# Patient Record
Sex: FEMALE | Race: OTHER | URBAN - METROPOLITAN AREA
[De-identification: names, ages, dates, MRNs, and addresses within clinical notes are randomized per-mention and may not be internally consistent; named-entity substitution may affect disease eponyms.]

---

## 2017-05-02 ENCOUNTER — HOSPITAL ENCOUNTER (OUTPATIENT)
Dept: LAB | Age: 67
Discharge: HOME | End: 2017-05-02
Payer: MEDICARE

## 2017-05-02 ENCOUNTER — OFFICE VISIT (OUTPATIENT)
Dept: GYNECOLOGY | Age: 67
End: 2017-05-02

## 2017-05-02 VITALS
DIASTOLIC BLOOD PRESSURE: 73 MMHG | WEIGHT: 193.8 LBS | SYSTOLIC BLOOD PRESSURE: 147 MMHG | HEIGHT: 62 IN | HEART RATE: 72 BPM | BODY MASS INDEX: 35.66 KG/M2

## 2017-05-02 DIAGNOSIS — Z85.42 HISTORY OF ENDOMETRIAL CANCER: Primary | ICD-10-CM

## 2017-05-02 PROCEDURE — 88142 CYTOPATH C/V THIN LAYER: CPT | Performed by: OBSTETRICS & GYNECOLOGY

## 2017-05-02 RX ORDER — HYDROCHLOROTHIAZIDE 12.5 MG/1
TABLET ORAL
Refills: 1 | COMMUNITY
Start: 2017-04-14

## 2017-05-02 RX ORDER — CARVEDILOL 6.25 MG/1
TABLET ORAL
Refills: 3 | COMMUNITY
Start: 2017-04-11 | End: 2018-05-08 | Stop reason: SDUPTHER

## 2017-05-02 RX ORDER — AMLODIPINE BESYLATE 10 MG/1
TABLET ORAL
Refills: 1 | COMMUNITY
Start: 2017-02-09

## 2017-05-02 RX ORDER — ALBUTEROL SULFATE 1.25 MG/3ML
SOLUTION RESPIRATORY (INHALATION)
Refills: 2 | COMMUNITY
Start: 2017-02-10

## 2017-05-02 NOTE — PATIENT INSTRUCTIONS
ICEX Activation    Thank you for requesting access to [facility]EX. Please follow the instructions below to securely access and download your online medical record. ICEX allows you to send messages to your doctor, view your test results, renew your prescriptions, schedule appointments, and more. How Do I Sign Up? 1. In your internet browser, go to [email or web address]. 2. Click on the First Time User? Click Here link in the Sign In box. You will see the New Member Sign Up page. 3. Enter your ICEX Access Code exactly as it appears below. You will not need to use this code after youve completed the sign-up process. If you do not sign up before the expiration date, you must request a new code. ICEX Access Code: [email or web address]  Expires: 2017 10:16 AM (This is the date your ICEX access code will )    4. Enter the last four digits of your Social Security Number (xxxx) and Date of Birth (mm/dd/yyyy) as indicated and click Submit. You will be taken to the next sign-up page. 5. Create a ICEX ID. This will be your ICEX login ID and cannot be changed, so think of one that is secure and easy to remember. 6. Create a ICEX password. You can change your password at any time. 7. Enter your Password Reset Question and Answer. This can be used at a later time if you forget your password. 8. Enter your e-mail address. You will receive e-mail notification when new information is available in [address] 9. Click Sign Up. You can now view and download portions of your medical record. 10. Click the Download Summary menu link to download a portable copy of your medical information. Additional Information    If you have questions, please visit the Frequently Asked Questions section of the ICEX website at [email or web address] Remember, ICEX is NOT to be used for urgent needs. For medical emergencies, dial 911.

## 2017-05-02 NOTE — PROGRESS NOTES
[address] [place] [address] Mathias Moritz 723 [address] [facility]  [phone]  MD Rosaura Rosenbaum MD    Patient ID:  Moon Campa  [id number]  [birth year]/28 y.o. Visit date: 5/2/2017    INTERVAL HISTORY: Moon Campa is a  female with a history of endometrial cancer previous procedures include staging resection, 5/7/2010. The patent has been followed without complication to date. Pathology:   Uterus, cervix, bilateral ovaries and fallopian tubes, hysterectomy:  Cervix: Microglandular hyperplasia. Endometrium: Invasive endometrioid adenocarcinoma, FIGO grade-2. Myometrium: Leiomyoma. Ovaries: Physiologic changes. Fallopian tubes: No pathologic abnormality. Synoptic Report:   Specimen: Uterus, cervix and adnexae. Lymph nodes: Not present. Specimen integrity: Intact hysterectomy. Procedure: Simple hysterectomy. Histologic type: Endometrioid adenocarcinoma. Histologic stgstrstastdstest:st st1st. Lymphatic invasion: Not identified. Pathologic stage: T1a NX MX (tumor invades . 3cm of an average  myometrial thickness of 1.6 cm). Last cytology: 3/16/2016, 8/15/2015, 2/12/2015   Normal    Imaging history: Mammogram current  Chemotherapy history: None    She is being seen today for routine followup at a six month interval  Ongoing workup for anemia of unclear etiology (GI)  Upper/lower endoscopy normal by patient report  No weight loss. Active, no restrictions. Negative GI review. Urinary incontinence noted. Negative cardiopulmonary review. Patient denies any abnormal bleeding or vaginal discharge. Weight stable.     ROS:  Review of Systems - History obtained from the patient  Hematological and Lymphatic ROS: negative for - bleeding problems, jaundice or night sweats  Respiratory ROS: no cough, shortness of breath, or wheezing  Cardiovascular ROS: no chest pain or dyspnea on exertion  Gastrointestinal ROS: no abdominal pain, change in bowel habits, or black or bloody stools  Genito-Urinary ROS: no dysuria, trouble voiding, or hematuria  Musculoskeletal ROS: negative for - gait disturbance or joint swelling  Neurological ROS: no TIA or stroke symptoms      Past Medical History:   Diagnosis Date    Arthritis     Asthma     Cancer ([facility] [address]     endometrial    COPD     Lung cancer ([facility]        Past Surgical History:   Procedure Laterality Date    HX GYN      hysterectomy    HX UROLOGICAL      uti x 5 seeing urologist       Social History     Social History    Marital status:      Spouse name: N/A    Number of children: N/A    Years of education: N/A     Occupational History    Not on file. Social History Main Topics    Smoking status: Former Smoker    Smokeless tobacco: Never Used    Alcohol use No    Drug use: No    Sexual activity: Not on file     Other Topics Concern    Not on file     Social History Narrative       History reviewed. No pertinent family history. Current Outpatient Prescriptions on File Prior to Visit   Medication Sig Dispense Refill    lisinopril (PRINIVIL, ZESTRIL) 40 mg tablet Take 40 mg by mouth daily.  tiotropium (SPIRIVA WITH HANDIHALER) 18 mcg inhalation capsule Take 1 Cap by inhalation daily.  montelukast (SINGULAIR) 10 mg tablet Take 10 mg by mouth daily.  fluticasone-salmeterol (ADVAIR DISKUS) 500-50 mcg/dose diskus inhaler Take 1 Puff by inhalation every twelve (12) hours.  IPRATROPIUM/ALBUTEROL SULFATE (COMBIVENT IN) Take  by inhalation. No current facility-administered medications on file prior to visit. Allergies   Allergen Reactions    Adhesive Rash    Biaxin [Clarithromycin] Rash       ROS:  Negative except HPI    OBJECTIVE:  Vitals:    Vitals:    05/02/17 1014   BP: 147/73   Pulse: 72   Weight: 193 lb 12.8 oz (87.9 kg)   Height: 5' 2\" (1.575 m)     Body mass index is 35.45 kg/(m^2). HEENT: within normal limits, no JVD.  No thyroid enlargement   RESPIRATORY: lungs clear to auscultation and percussion   CARDIOVASC: Regular rate and rhythm    GASTROINT: soft, non-tender, without masses or organomegaly. Incision intact, no hernia. MUSCULOSKEL: no joint tenderness, deformity or swelling   INTEGUMENT:    EXTREMITIES: extremities normal, atraumatic, no cyanosis or edema   PELVIC: External genitalia: normal general appearance, BUS negative  Vaginal: atrophic mucosa, cytology taken without friability. No suspicious masses, induration or tenderness. Adnexa: normal bimanual exam, non palpable and removed surgically. PSW clear   RECTAL: rectal exam not indicated   RASHAD SURVEY: Cervical, supraclavicular, and axillary nodes normal.   NEURO: Grossly normal     DATE REVIEW as available:  Lab Results   Component Value Date/Time    WBC 8.6 05/08/2010 05:50 AM    HGB 12.1 05/08/2010 05:50 AM    HCT 38.3 05/08/2010 05:50 AM    PLATELET 802 40/62/3237 05:50 AM    MCV 88.0 05/08/2010 05:50 AM     Lab Results   Component Value Date/Time    Sodium 139 05/08/2010 05:50 AM    Potassium 5.2 05/08/2010 05:50 AM    Chloride 104 05/08/2010 05:50 AM    CO2 30 05/08/2010 05:50 AM    Anion gap 5 05/08/2010 05:50 AM    Glucose 95 05/08/2010 05:50 AM    BUN 10 05/08/2010 05:50 AM    Creatinine 0.7 05/08/2010 05:50 AM    BUN/Creatinine ratio 14 05/08/2010 05:50 AM    GFR est AA >60 05/08/2010 05:50 AM    GFR est non-AA >60 05/08/2010 05:50 AM    Calcium 8.8 05/08/2010 05:50 AM       IMPRESSION AND PLAN:    Mary Ackerman has a working diagnosis of endometrial cancer, ELIZA.     Cytology taken without friability  Return 12 months or PRN, pending normal cytology      Yoan Viveros MD  5/2/2017/9:44 AM

## 2017-05-02 NOTE — PROGRESS NOTES
One year check up, pt was diagnosed with lung cancer since her last visit, pt reports no abnormal spotting or bleeding

## 2017-05-09 NOTE — PROGRESS NOTES
Patient:   Torey Palomino  SSN: [id number]  : 1950    Date:    2017    Ms. Petar Ervin's cytology/Pap smear has been interpreted as within normal limts. I would ask that subsequent Pap smears be performed at the interval discussed at the last office visit.     If there are any questions please do not hesitate to contact our offices ([phone])    Ayden De La Cruz MD

## 2018-05-08 ENCOUNTER — HOSPITAL ENCOUNTER (OUTPATIENT)
Dept: LAB | Age: 68
Discharge: HOME | End: 2018-05-08
Payer: MEDICARE

## 2018-05-08 ENCOUNTER — OFFICE VISIT (OUTPATIENT)
Dept: GYNECOLOGY | Age: 68
End: 2018-05-08

## 2018-05-08 VITALS
WEIGHT: 188.4 LBS | DIASTOLIC BLOOD PRESSURE: 52 MMHG | SYSTOLIC BLOOD PRESSURE: 136 MMHG | BODY MASS INDEX: 34.67 KG/M2 | HEART RATE: 74 BPM | HEIGHT: 62 IN

## 2018-05-08 DIAGNOSIS — Z91.89 GYN EXAM FOR HIGH-RISK MEDICARE PATIENT: ICD-10-CM

## 2018-05-08 DIAGNOSIS — Z85.42 HISTORY OF ENDOMETRIAL CANCER: Primary | ICD-10-CM

## 2018-05-08 PROCEDURE — 88142 CYTOPATH C/V THIN LAYER: CPT | Performed by: OBSTETRICS & GYNECOLOGY

## 2018-05-08 RX ORDER — FUROSEMIDE 40 MG/1
TABLET ORAL DAILY
COMMUNITY

## 2018-05-08 RX ORDER — CARVEDILOL 25 MG/1
TABLET ORAL
Refills: 3 | COMMUNITY
Start: 2018-03-13

## 2018-05-08 NOTE — PATIENT INSTRUCTIONS
[employer] Activation    Thank you for requesting access to [employer]. Please follow the instructions below to securely access and download your online medical record. [employer] allows you to send messages to your doctor, view your test results, renew your prescriptions, schedule appointments, and more. How Do I Sign Up? 1. In your internet browser, go to [email or web address][email or web address] nap- [email or web address]. 2. Click on the First Time User? Click Here link in the Sign In box. You will see the New Member Sign Up page. 3. Enter your [employer] Access Code exactly as it appears below. You will not need to use this code after youve completed the sign-up process. If you do not sign up before the expiration date, you must request a new code. [employer] Access Code: [email or web address]  Expires: 2018  9:37 AM (This is the date your [employer] access code will )    4. Enter the last four digits of your Social Security Number (xxxx) and Date of Birth (mm/dd/yyyy) as indicated and click Submit. You will be taken to the next sign-up page. 5. Create a [employer]ity ID. This will be your [employer] login ID and cannot be changed, so think of one that is secure and easy to remember. 6. Create a [employer] password. You can change your password at any time. 7. Enter your Password Reset Question and Answer. This can be used at a later time if you forget your password. 8. Enter your e-mail address. You will receive e-mail notification when new information is available in [address] 9. Click Sign Up. You can now view and download portions of your medical record. 10. Click the Download Summary menu link to download a portable copy of your medical information. Additional Information    If you have questions, please visit the Frequently Asked Questions section of the [employer] website at [email or web address][email or web address] nap- [email or web address] Att[employer] [email or web address]/. Remember, [employer] is NOT to be used for urgent needs. For medical emergencies, dial 911.

## 2018-05-08 NOTE — PROGRESS NOTES
one year check up, pt reports no abnormal spotting or bleeding, pt states she has no questions or concerns for today's visit, Patient states she is no longer taking the following medications: Amlodipine, HCTZ and Lisinopril

## 2018-05-08 NOTE — PROGRESS NOTES
[address] [place] [address] Mathias Moritz [address], [address] [facility]  [phone]  MD Leslie Gomez MD    Patient ID:  Juliana Burrell  [id number]  1950/67 y.o. Visit date: 5/8/2018    INTERVAL HISTORY: Juliana Burrell is a  female with a history of endometrial cancer previous procedures include staging resection, 5/7/2010. The patent has been followed without complication to date. Pathology:   Uterus, cervix, bilateral ovaries and fallopian tubes, hysterectomy:  Cervix: Microglandular hyperplasia. Endometrium: Invasive endometrioid adenocarcinoma, FIGO grade-2. Myometrium: Leiomyoma. Ovaries: Physiologic changes. Fallopian tubes: No pathologic abnormality. Synoptic Report:   Specimen: Uterus, cervix and adnexae. Lymph nodes: Not present. Specimen integrity: Intact hysterectomy. Procedure: Simple hysterectomy. Histologic type: Endometrioid adenocarcinoma. Histologic stgstrstastdstest:st st1st. Lymphatic invasion: Not identified. Pathologic stage: T1a NX MX (tumor invades . 3cm of an average  myometrial thickness of 1.6 cm). Last cytology: 5/2017, 3/16/2016, 8/15/2015, 2/12/2015   Normal    Imaging history: Mammogram current  Chemotherapy history: None    5/8/2018:    She is being seen today for routine followup at a 12 month interval  Upper/lower endoscopy normal by patient report  No weight loss. O2 dependent. Completed Rx for lung cancer (RT)  Active, restricted by SOB. Negative GI review. Urinary incontinence noted. Patient denies any abnormal bleeding or vaginal discharge. Weight stable.     ROS:  Review of Systems - History obtained from the patient  Hematological and Lymphatic ROS: negative for - bleeding problems, jaundice or night sweats  Gastrointestinal ROS: no abdominal pain, change in bowel habits, or black or bloody stools  Genito-Urinary ROS: no dysuria, trouble voiding, or hematuria  Musculoskeletal ROS: negative for - gait disturbance or joint swelling  Neurological ROS: no TIA or stroke symptoms      Past Medical History:   Diagnosis Date    Arthritis     Asthma     Cancer ([facility] [address]     endometrial    COPD     Lung cancer ([facility] [address]        Past Surgical History:   Procedure Laterality Date    HX GYN      hysterectomy    HX UROLOGICAL      uti x 5 seeing urologist       Social History     Social History    Marital status:      Spouse name: N/A    Number of children: N/A    Years of education: N/A     Occupational History    Not on file. Social History Main Topics    Smoking status: Former Smoker    Smokeless tobacco: Never Used    Alcohol use No    Drug use: No    Sexual activity: Not on file     Other Topics Concern    Not on file     Social History Narrative       History reviewed. No pertinent family history. Current Outpatient Prescriptions on File Prior to Visit   Medication Sig Dispense Refill    tiotropium (SPIRIVA WITH HANDIHALER) 18 mcg inhalation capsule Take 1 Cap by inhalation daily.  montelukast (SINGULAIR) 10 mg tablet Take 10 mg by mouth daily.  fluticasone-salmeterol (ADVAIR DISKUS) 500-50 mcg/dose diskus inhaler Take 1 Puff by inhalation every twelve (12) hours.  IPRATROPIUM/ALBUTEROL SULFATE (COMBIVENT IN) Take  by inhalation.  albuterol (ACCUNEB) 1.25 mg/3 mL nebu INHALE 1 VIAL VIA NEB EVERY 4-6 HOURS AROUND THE CLOCK  2    amLODIPine (NORVASC) 10 mg tablet TAKE 1 TABLET BY MOUTH ONCE A DAY  1    hydroCHLOROthiazide (HYDRODIURIL) 12.5 mg tablet TAKE 1 TABLET BY MOUTH ONCE A DAY  1    lisinopril (PRINIVIL, ZESTRIL) 40 mg tablet Take 40 mg by mouth daily. No current facility-administered medications on file prior to visit.         Allergies   Allergen Reactions    Adhesive Rash    Biaxin [Clarithromycin] Rash       ROS:  Negative except HPI    OBJECTIVE:  Vitals:    Vitals:    05/08/18 0948   BP: 136/52   Pulse: 74   Weight: 188 lb 6.4 oz (85.5 kg)   Height: 5' 2\" (1.575 m)     Body mass index is 34.46 kg/(m^2). HEENT: within normal limits, no JVD. No thyroid enlargement   RESPIRATORY: Distant BS to bases   CARDIOVASC: Regular rate and rhythm    GASTROINT: soft, non-tender, without masses or organomegaly. Incision intact, no hernia. MUSCULOSKEL: no joint tenderness, deformity or swelling   INTEGUMENT:    EXTREMITIES: extremities normal, atraumatic, no cyanosis or edema   PELVIC: External genitalia: normal general appearance, BUS negative  Vaginal: atrophic mucosa, cytology taken without friability. No suspicious masses, induration or tenderness. Adnexa: normal bimanual exam, non palpable and removed surgically. PSW clear   RECTAL: rectal exam not indicated   RASHAD SURVEY: Cervical, supraclavicular, and axillary nodes normal.   NEURO: Grossly normal     DATE REVIEW as available:  Lab Results   Component Value Date/Time    WBC 8.6 05/08/2010 05:50 AM    HGB 12.1 05/08/2010 05:50 AM    HCT 38.3 05/08/2010 05:50 AM    PLATELET 611 33/44/8356 05:50 AM    MCV 88.0 05/08/2010 05:50 AM     Lab Results   Component Value Date/Time    Sodium 139 05/08/2010 05:50 AM    Potassium 5.2 (H) 05/08/2010 05:50 AM    Chloride 104 05/08/2010 05:50 AM    CO2 30 05/08/2010 05:50 AM    Anion gap 5 05/08/2010 05:50 AM    Glucose 95 05/08/2010 05:50 AM    BUN 10 05/08/2010 05:50 AM    Creatinine 0.7 05/08/2010 05:50 AM    BUN/Creatinine ratio 14 05/08/2010 05:50 AM    GFR est AA >60 05/08/2010 05:50 AM    GFR est non-AA >60 05/08/2010 05:50 AM    Calcium 8.8 05/08/2010 05:50 AM       IMPRESSION AND PLAN:    Sherren Sam has a working diagnosis of endometrial cancer, ELIZA.     Cytology taken without friability  Continue lung cancer follow up  May continue care with Dr. Catherine Palacio    Return 12 months or PRN, pending normal cytology      Jessica Moreno MD  5/8/2018/9:44 AM

## 2018-05-11 NOTE — PROGRESS NOTES
Patient:   Catie Givens  SSN: [id number]  : 1950    Date:    2018    Ms. Elvin Ervin's cytology/Pap smear has been interpreted as within normal limts. I would ask that subsequent Pap smears be performed at the interval discussed at the last office visit.     If there are any questions please do not hesitate to contact our offices [phone] John Murillo MD